# Patient Record
Sex: FEMALE | Race: BLACK OR AFRICAN AMERICAN | Employment: UNEMPLOYED | URBAN - METROPOLITAN AREA
[De-identification: names, ages, dates, MRNs, and addresses within clinical notes are randomized per-mention and may not be internally consistent; named-entity substitution may affect disease eponyms.]

---

## 2021-08-11 ENCOUNTER — HOSPITAL ENCOUNTER (EMERGENCY)
Age: 26
Discharge: HOME | End: 2021-08-11
Attending: EMERGENCY MEDICINE

## 2021-08-11 VITALS
WEIGHT: 125.88 LBS | SYSTOLIC BLOOD PRESSURE: 110 MMHG | OXYGEN SATURATION: 100 % | HEART RATE: 94 BPM | TEMPERATURE: 99.1 F | BODY MASS INDEX: 19.76 KG/M2 | DIASTOLIC BLOOD PRESSURE: 55 MMHG | RESPIRATION RATE: 14 BRPM | HEIGHT: 67 IN

## 2021-08-11 DIAGNOSIS — R30.0 DYSURIA: Primary | ICD-10-CM

## 2021-08-11 LAB
BACTERIA WET PREP: NORMAL
BACTERIA: ABNORMAL /HPF
BILIRUBIN URINE: NEGATIVE
BLOOD, URINE: NEGATIVE
CLARITY: CLEAR
CLUE CELLS: NORMAL
COLOR: YELLOW
EPITHELIAL CELLS WET PREP: NORMAL
EPITHELIAL CELLS, UA: ABNORMAL /HPF (ref 0–5)
GLUCOSE URINE: NEGATIVE MG/DL
HCG(URINE) PREGNANCY TEST: NEGATIVE
KETONES, URINE: NEGATIVE MG/DL
LEUKOCYTE ESTERASE, URINE: NEGATIVE
MICROSCOPIC EXAMINATION: YES
MUCUS: ABNORMAL /LPF
NITRITE, URINE: POSITIVE
PH UA: 6 (ref 5–8)
PROTEIN UA: NEGATIVE MG/DL
RBC UA: ABNORMAL /HPF (ref 0–4)
RBC WET PREP: NORMAL
SOURCE WET PREP: NORMAL
SPECIFIC GRAVITY UA: >=1.03 (ref 1–1.03)
TRICHOMONAS PREP: NORMAL
URINE REFLEX TO CULTURE: ABNORMAL
URINE TYPE: ABNORMAL
UROBILINOGEN, URINE: 0.2 E.U./DL
WBC UA: ABNORMAL /HPF (ref 0–5)
WBC WET PREP: NORMAL
YEAST WET PREP: NORMAL

## 2021-08-11 PROCEDURE — 84703 CHORIONIC GONADOTROPIN ASSAY: CPT

## 2021-08-11 PROCEDURE — 87491 CHLMYD TRACH DNA AMP PROBE: CPT

## 2021-08-11 PROCEDURE — 99283 EMERGENCY DEPT VISIT LOW MDM: CPT

## 2021-08-11 PROCEDURE — 6360000002 HC RX W HCPCS: Performed by: EMERGENCY MEDICINE

## 2021-08-11 PROCEDURE — 87591 N.GONORRHOEAE DNA AMP PROB: CPT

## 2021-08-11 PROCEDURE — 96372 THER/PROPH/DIAG INJ SC/IM: CPT

## 2021-08-11 PROCEDURE — 81001 URINALYSIS AUTO W/SCOPE: CPT

## 2021-08-11 PROCEDURE — 87210 SMEAR WET MOUNT SALINE/INK: CPT

## 2021-08-11 RX ORDER — CIPROFLOXACIN 500 MG/1
500 TABLET, FILM COATED ORAL 2 TIMES DAILY
Qty: 20 TABLET | Refills: 0 | Status: SHIPPED | OUTPATIENT
Start: 2021-08-11 | End: 2021-08-11 | Stop reason: ALTCHOICE

## 2021-08-11 RX ORDER — CEFTRIAXONE 500 MG/1
500 INJECTION, POWDER, FOR SOLUTION INTRAMUSCULAR; INTRAVENOUS ONCE
Status: COMPLETED | OUTPATIENT
Start: 2021-08-11 | End: 2021-08-11

## 2021-08-11 RX ORDER — LEVOFLOXACIN 500 MG/1
500 TABLET, FILM COATED ORAL DAILY
Qty: 10 TABLET | Refills: 0 | Status: SHIPPED | OUTPATIENT
Start: 2021-08-11 | End: 2021-08-21

## 2021-08-11 RX ADMIN — CEFTRIAXONE SODIUM 500 MG: 500 INJECTION, POWDER, FOR SOLUTION INTRAMUSCULAR; INTRAVENOUS at 12:14

## 2021-08-11 ASSESSMENT — ENCOUNTER SYMPTOMS
SORE THROAT: 0
EYE DISCHARGE: 0
BACK PAIN: 0
RHINORRHEA: 0
ABDOMINAL PAIN: 1
DIARRHEA: 0
WHEEZING: 0
SHORTNESS OF BREATH: 0
EYE PAIN: 0
VOMITING: 0
NAUSEA: 0
COUGH: 0

## 2021-08-11 NOTE — ED PROVIDER NOTES
[id number] [facility]  eMERGENCY dEPARTMENT eNCOUnter        Pt Name: Koffi Heck  MRN: [id number]  [place] 1995  Date of evaluation: 8/11/2021  Provider: Swathi Hill MD  PCP: No primary care provider on file. CHIEF COMPLAINT       Chief Complaint   Patient presents with    Dysuria     burning with urination, discharge, and malodorous x1wk. HISTORY OFPRESENT ILLNESS   (Location/Symptom, Timing/Onset, Context/Setting, Quality, Duration, Modifying Factors,Severity)  Note limiting factors. Koffi Heck is a 23 y.o. female       Location/Symptom: Burning with urination and some left-sided abdominal pain  Timing/Onset: About a week  Context/Setting: Patient doubts pregnancy. She does have a little bit of vaginal discharge. Quality: She reports dysuria as opposed to just vaginal irritation from the urine  Duration: About 1 week  Modifying Factors: No associated fever chills nausea vomiting    Nursing Noteswere all reviewed and agreed with or any disagreements were addressed  in the HPI. REVIEW OF SYSTEMS    (2-9 systems for level 4, 10 or more for level 5)     Review of Systems   Constitutional: Negative for chills, fatigue and fever. HENT: Negative for ear pain, rhinorrhea and sore throat. Eyes: Negative for pain, discharge and visual disturbance. Respiratory: Negative for cough, shortness of breath and wheezing. Cardiovascular: Negative for chest pain, palpitations and leg swelling. Gastrointestinal: Positive for abdominal pain. Negative for diarrhea, nausea and vomiting. Genitourinary: Positive for dysuria, flank pain and vaginal discharge. Negative for difficulty urinating and pelvic pain. Musculoskeletal: Negative for arthralgias, back pain, joint swelling and neck pain. Skin: Negative for rash. Allergic/Immunologic: Negative for environmental allergies. Neurological: Negative for dizziness, seizures, syncope and headaches. 7 for level 4, 8 or more for level 5)     ED Triage Vitals [08/11/21 1117]   BP Temp Temp Source Pulse Resp SpO2 Height Weight   (!) 110/55 99.1 °F (37.3 °C) Oral 94 14 100 % 5' 7\" (1.702 m) 125 lb 14.1 oz (57.1 kg)      height is 5' 7\" (1.702 m) and weight is 125 lb 14.1 oz (57.1 kg). Her oral temperature is 99.1 °F (37.3 °C). Her blood pressure is 110/55 (abnormal) and her pulse is 94. Her respiration is 14 and oxygen saturation is 100%. Physical Exam  Constitutional:       Appearance: She is well-developed. She is not diaphoretic. HENT:      Head: Normocephalic and atraumatic. Right Ear: External ear normal.      Left Ear: External ear normal.   Eyes:      General: No scleral icterus. Right eye: No discharge. Left eye: No discharge. Neck:      Thyroid: No thyromegaly. Vascular: No JVD. Trachea: No tracheal deviation. Cardiovascular:      Rate and Rhythm: Normal rate and regular rhythm. Heart sounds: No murmur heard. No friction rub. No gallop. Pulmonary:      Effort: Pulmonary effort is normal. No respiratory distress. Breath sounds: Normal breath sounds. No stridor. No wheezing or rales. Abdominal:      General: There is no distension. Palpations: Abdomen is soft. Tenderness: There is abdominal tenderness in the left upper quadrant and left lower quadrant. There is no guarding or rebound. Musculoskeletal:         General: No tenderness. Cervical back: Normal range of motion. Skin:     General: Skin is warm and dry. Findings: No rash (On exposed body surfaces). Neurological:      Mental Status: She is alert and oriented to person, place, and time. Coordination: Coordination normal.   Psychiatric:         Behavior: Behavior normal.         Thought Content:  Thought content normal.         DIAGNOSTIC RESULTS   LABS:    Results for orders placed or performed during the hospital encounter of 08/11/21   Wet Prep, Genital Specimen: Vaginal   Result Value Ref Range    Trichomonas Prep None Seen     Yeast, Wet Prep None Seen     Clue Cells, Wet Prep None Seen     WBC, Wet Prep 2+     RBC, Wet Prep <1+     Epi Cells 3+     Bacteria 2+     Source Wet Prep Vaginal    Urinalysis Reflex to Culture    Specimen: Urine, clean catch   Result Value Ref Range    Color, UA Yellow Straw/Yellow    Clarity, UA Clear Clear    Glucose, Ur Negative Negative mg/dL    Bilirubin Urine Negative Negative    Ketones, Urine Negative Negative mg/dL    Specific Gravity, UA >=1.030 1.005 - 1.030    Blood, Urine Negative Negative    pH, UA 6.0 5.0 - 8.0    Protein, UA Negative Negative mg/dL    Urobilinogen, Urine 0.2 <2.0 E.U./dL    Nitrite, Urine POSITIVE (A) Negative    Leukocyte Esterase, Urine Negative Negative    Microscopic Examination YES     Urine Type NotGiven     Urine Reflex to Culture Not Indicated    Pregnancy, Urine   Result Value Ref Range    HCG(Urine) Pregnancy Test Negative Detects HCG level >20 MIU/mL   Microscopic Urinalysis   Result Value Ref Range    Mucus, UA 2+ (A) None Seen /LPF    WBC, UA 3-5 0 - 5 /HPF    RBC, UA 0-2 0 - 4 /HPF    Epithelial Cells, UA 11-20 (A) 0 - 5 /HPF    Bacteria, UA 3+ (A) None Seen /HPF       All other labs were within normal range or not returned as of this dictation. EKG:  All EKG's are interpreted by the Emergency Department Physician who either signs orCo-signs this chart in the absence of a cardiologist.    None    RADIOLOGY:   Non-plain film images such as CT, Ultrasound and MRI are read by the radiologist. Plain radiographic images are visualized and preliminarily interpreted by the  EDProvider with the below findings:    None        PROCEDURES   Unless otherwise noted below, none     Procedures    CRITICAL CARE TIME   N/A    CONSULTS:  None    EMERGENCY DEPARTMENT COURSE and DIFFERENTIAL DIAGNOSIS/MDM:   Vitals:    Vitals:    08/11/21 1117   BP: (!) 110/55   Pulse: 94   Resp: 14   Temp: 99.1 °F (37.3 °C)

## 2021-08-12 LAB
C TRACH DNA GENITAL QL NAA+PROBE: NEGATIVE
N. GONORRHOEAE DNA: NEGATIVE

## 2024-10-03 ENCOUNTER — HOSPITAL ENCOUNTER (OUTPATIENT)
Dept: GENERAL RADIOLOGY | Age: 29
Discharge: HOME | End: 2024-10-03
Payer: MEDICAID

## 2024-10-03 ENCOUNTER — HOSPITAL ENCOUNTER (OUTPATIENT)
Age: 29
Discharge: HOME | End: 2024-10-03
Payer: MEDICAID

## 2024-10-03 DIAGNOSIS — R76.11 PPD POSITIVE: ICD-10-CM

## 2024-10-03 PROCEDURE — 71046 X-RAY EXAM CHEST 2 VIEWS: CPT
